# Patient Record
Sex: FEMALE
[De-identification: names, ages, dates, MRNs, and addresses within clinical notes are randomized per-mention and may not be internally consistent; named-entity substitution may affect disease eponyms.]

---

## 2024-05-20 PROBLEM — Z00.00 ENCOUNTER FOR PREVENTIVE HEALTH EXAMINATION: Status: ACTIVE | Noted: 2024-05-20

## 2024-05-21 ENCOUNTER — APPOINTMENT (OUTPATIENT)
Dept: ORTHOPEDIC SURGERY | Facility: CLINIC | Age: 79
End: 2024-05-21
Payer: COMMERCIAL

## 2024-05-21 DIAGNOSIS — Z78.9 OTHER SPECIFIED HEALTH STATUS: ICD-10-CM

## 2024-05-21 DIAGNOSIS — M65.331 TRIGGER FINGER, RIGHT MIDDLE FINGER: ICD-10-CM

## 2024-05-21 PROCEDURE — 99203 OFFICE O/P NEW LOW 30 MIN: CPT | Mod: 25

## 2024-05-21 PROCEDURE — 73140 X-RAY EXAM OF FINGER(S): CPT | Mod: RT

## 2024-05-21 PROCEDURE — 20550 NJX 1 TENDON SHEATH/LIGAMENT: CPT | Mod: RT

## 2024-05-21 RX ORDER — GLIPIZIDE 2.5 MG/1
TABLET ORAL
Refills: 0 | Status: ACTIVE | COMMUNITY

## 2024-05-21 RX ORDER — ORAL SEMAGLUTIDE 7 MG/1
7 TABLET ORAL
Refills: 0 | Status: ACTIVE | COMMUNITY

## 2024-05-21 RX ORDER — CLOPIDOGREL 75 MG/1
TABLET, FILM COATED ORAL
Refills: 0 | Status: ACTIVE | COMMUNITY

## 2024-05-21 RX ORDER — AMLODIPINE BESYLATE 5 MG/1
TABLET ORAL
Refills: 0 | Status: ACTIVE | COMMUNITY

## 2024-05-21 RX ORDER — EZETIMIBE 10 MG/1
TABLET ORAL
Refills: 0 | Status: ACTIVE | COMMUNITY

## 2024-05-21 RX ORDER — ROSUVASTATIN CALCIUM 5 MG/1
TABLET, FILM COATED ORAL
Refills: 0 | Status: ACTIVE | COMMUNITY

## 2024-05-21 RX ORDER — ACARBOSE 100 MG/1
100 TABLET ORAL
Refills: 0 | Status: ACTIVE | COMMUNITY

## 2024-05-21 RX ORDER — DAPAGLIFLOZIN 10 MG/1
TABLET, FILM COATED ORAL
Refills: 0 | Status: ACTIVE | COMMUNITY

## 2024-05-21 RX ORDER — LOSARTAN POTASSIUM 100 MG/1
TABLET, FILM COATED ORAL
Refills: 0 | Status: ACTIVE | COMMUNITY

## 2024-05-21 NOTE — ASSESSMENT
[FreeTextEntry1] : The condition was explained to the patient. Discussed risks and benefits of treatment options for tenosynovitis - activity modification, NSAID, splint, steroid injection, or surgery. Patient would like to proceed with CSI for trigger finger. - Discussed risks, benefits, and alternatives as well as contents of injection. Risks include, but are not limited allergic reaction, flare reaction, injection site pain, bruising, numbness, increased blood sugar, skin discoloration, fat atrophy, tendon rupture, and infection. Risk of immune suppression and increased susceptibility to infection with steroid use. We discussed that too many injections may lead to weakening of the tendon and tendon rupture. Patient expressed understanding and would like to proceed with injection. - The skin over the RIGHT middle finger A1 pulley was cleansed with alcohol and anesthetized with ethyl chloride. The flexor sheath was injected with 3mg of celestone, 0.5cc of 1% lidocaine. Site was dressed with a band-aid. Patient tolerated the procedure well. - discussed that it may take up to 1 week for symptoms to improve after CSI.  - prescribed OT for R hand.  F/u PRN.

## 2024-05-21 NOTE — IMAGING
[de-identified] : RIGHT HAND skin intact. mild swelling of MF. TTP to MF A1 pulley, proximal phalanx, and PIPJ. wrist ROM: good extension, flexion. good pronation, supination. MF: PIPJ mild flexion contracture, otherwise good extension. limited PIPJ and DIPJ flexion. good flex/ext other digits. SILT median, ulnar, radial distributions. palpable radial pulse, brisk cap refill all digits. no triggering.   XRAYS OF RIGHT MIDDLE FINGER: no acute displaced fracture or dislocation.

## 2024-05-21 NOTE — HISTORY OF PRESENT ILLNESS
[Tightness] : tightness [] : yes [de-identified] : 5/21/24: HPI obtained with  ([place]ish) 79yo female (RHD) presents for RIGHT middle finger pain and swelling for a few months. c/o unable to bend finger. Denies injury. Reports PCP prescribed PT. Reports no improvement with therapy.  Hx: NIDDM. HTN. HLD. [FreeTextEntry1] : RIGHT ring finger  [FreeTextEntry5] : JERRICA kern [RHD] 78 year old female is here today c/o RIGHT middle finger pain and swelling a few months w/o injury. saw PCP +PT w/o relief of symptoms. pt states she is unable to bend finger. denies prior injury to finger.